# Patient Record
Sex: FEMALE | Race: BLACK OR AFRICAN AMERICAN | NOT HISPANIC OR LATINO
[De-identification: names, ages, dates, MRNs, and addresses within clinical notes are randomized per-mention and may not be internally consistent; named-entity substitution may affect disease eponyms.]

---

## 2020-07-08 PROBLEM — Z00.00 ENCOUNTER FOR PREVENTIVE HEALTH EXAMINATION: Status: ACTIVE | Noted: 2020-07-08

## 2020-07-09 ENCOUNTER — APPOINTMENT (OUTPATIENT)
Dept: RHEUMATOLOGY | Facility: CLINIC | Age: 50
End: 2020-07-09
Payer: MEDICAID

## 2020-07-09 ENCOUNTER — LABORATORY RESULT (OUTPATIENT)
Age: 50
End: 2020-07-09

## 2020-07-09 VITALS
SYSTOLIC BLOOD PRESSURE: 137 MMHG | HEIGHT: 63 IN | BODY MASS INDEX: 21.97 KG/M2 | DIASTOLIC BLOOD PRESSURE: 91 MMHG | HEART RATE: 70 BPM | WEIGHT: 124 LBS | TEMPERATURE: 97.6 F | OXYGEN SATURATION: 98 %

## 2020-07-09 PROCEDURE — 99204 OFFICE O/P NEW MOD 45 MIN: CPT

## 2020-07-16 LAB
ACE BLD-CCNC: 53 U/L
ALBUMIN SERPL ELPH-MCNC: 4.2 G/DL
ALP BLD-CCNC: 56 U/L
ALT SERPL-CCNC: 14 U/L
ANA PAT FLD IF-IMP: ABNORMAL
ANA SER IF-ACNC: ABNORMAL
ANION GAP SERPL CALC-SCNC: 14 MMOL/L
APPEARANCE: CLEAR
AST SERPL-CCNC: 19 U/L
B2 GLYCOPROT1 IGA SERPL IA-ACNC: 7.1 SAU
B2 GLYCOPROT1 IGG SER-ACNC: 5.3 SGU
B2 GLYCOPROT1 IGM SER-ACNC: <5 SMU
BACTERIA: NEGATIVE
BASOPHILS # BLD AUTO: 0.03 K/UL
BASOPHILS NFR BLD AUTO: 0.8 %
BILIRUB SERPL-MCNC: 0.7 MG/DL
BILIRUBIN URINE: NEGATIVE
BLOOD URINE: NEGATIVE
BUN SERPL-MCNC: 11 MG/DL
C3 SERPL-MCNC: 126 MG/DL
C4 SERPL-MCNC: 33 MG/DL
CALCIUM SERPL-MCNC: 9.5 MG/DL
CARDIOLIPIN AB SER IA-ACNC: NEGATIVE
CARDIOLIPIN IGM SER-MCNC: 11.2 GPL
CARDIOLIPIN IGM SER-MCNC: <5 MPL
CCP AB SER IA-ACNC: 32 UNITS
CHLORIDE SERPL-SCNC: 103 MMOL/L
CO2 SERPL-SCNC: 25 MMOL/L
COLOR: YELLOW
CREAT SERPL-MCNC: 0.56 MG/DL
CREAT SPEC-SCNC: 185 MG/DL
CREAT/PROT UR: 0.1 RATIO
CRP SERPL-MCNC: 0.18 MG/DL
DEPRECATED CARDIOLIPIN IGA SER: <5 APL
DSDNA AB SER-ACNC: 66 IU/ML
ENA RNP AB SER IA-ACNC: 0.3 AL
ENA SCL70 IGG SER IA-ACNC: <0.2 AL
ENA SM AB SER IA-ACNC: <0.2 AL
ENA SS-A AB SER IA-ACNC: <0.2 AL
ENA SS-B AB SER IA-ACNC: <0.2 AL
EOSINOPHIL # BLD AUTO: 0.07 K/UL
EOSINOPHIL NFR BLD AUTO: 1.9 %
ERYTHROCYTE [SEDIMENTATION RATE] IN BLOOD BY WESTERGREN METHOD: 61 MM/HR
G6PD SER-CCNC: 15.4 U/G HGB
GLUCOSE QUALITATIVE U: NEGATIVE
GLUCOSE SERPL-MCNC: 81 MG/DL
HBV CORE IGG+IGM SER QL: NONREACTIVE
HBV SURFACE AB SER QL: NONREACTIVE
HBV SURFACE AG SER QL: NONREACTIVE
HCT VFR BLD CALC: 50.6 %
HCV AB SER QL: NONREACTIVE
HCV S/CO RATIO: 0.19 S/CO
HGB BLD-MCNC: 15.6 G/DL
HYALINE CASTS: 4 /LPF
IMM GRANULOCYTES NFR BLD AUTO: 0 %
KETONES URINE: NEGATIVE
LEUKOCYTE ESTERASE URINE: NEGATIVE
LYMPHOCYTES # BLD AUTO: 1.08 K/UL
LYMPHOCYTES NFR BLD AUTO: 28.6 %
M TB IFN-G BLD-IMP: NEGATIVE
MAN DIFF?: NORMAL
MCHC RBC-ENTMCNC: 30 PG
MCHC RBC-ENTMCNC: 30.8 GM/DL
MCV RBC AUTO: 97.3 FL
MICROSCOPIC-UA: NORMAL
MONOCYTES # BLD AUTO: 0.32 K/UL
MONOCYTES NFR BLD AUTO: 8.5 %
NEUTROPHILS # BLD AUTO: 2.28 K/UL
NEUTROPHILS NFR BLD AUTO: 60.2 %
NITRITE URINE: NEGATIVE
PH URINE: 5.5
PLATELET # BLD AUTO: 196 K/UL
POTASSIUM SERPL-SCNC: 5 MMOL/L
PROT SERPL-MCNC: 8.2 G/DL
PROT UR-MCNC: 14 MG/DL
PROTEIN URINE: NORMAL
QUANTIFERON TB PLUS MITOGEN MINUS NIL: 7.32 IU/ML
QUANTIFERON TB PLUS NIL: 0.01 IU/ML
QUANTIFERON TB PLUS TB1 MINUS NIL: 0 IU/ML
QUANTIFERON TB PLUS TB2 MINUS NIL: 0 IU/ML
RBC # BLD: 5.2 M/UL
RBC # FLD: 14.9 %
RED BLOOD CELLS URINE: 6 /HPF
RF+CCP IGG SER-IMP: ABNORMAL
RHEUMATOID FACT SER QL: <10 IU/ML
SODIUM SERPL-SCNC: 142 MMOL/L
SPECIFIC GRAVITY URINE: 1.03
SQUAMOUS EPITHELIAL CELLS: 3 /HPF
THYROGLOB AB SERPL-ACNC: <20 IU/ML
THYROPEROXIDASE AB SERPL IA-ACNC: <10 IU/ML
TSH SERPL-ACNC: 2.87 UIU/ML
UROBILINOGEN URINE: NORMAL
WBC # FLD AUTO: 3.78 K/UL
WHITE BLOOD CELLS URINE: 3 /HPF

## 2020-07-16 NOTE — ASSESSMENT
[FreeTextEntry1] : 50F with benign cystic breast lesions with elevated RADHA 1:1280 but no features on SLE or other autoimmune disease. Unclear what the cystic breast lesions are - if pt has inflammatory/granulomatous mastitis it might explain the elevated RADHA.\par \par Plan:\par -obtain all pertinent autoimmune labs. \par -asked patient to obtain records of breast biopsy\par -follow up after above

## 2020-07-16 NOTE — CONSULT LETTER
[Dear  ___] : Dear  [unfilled], [Consult Letter:] : I had the pleasure of evaluating your patient, [unfilled]. [Please see my note below.] : Please see my note below. [Consult Closing:] : Thank you very much for allowing me to participate in the care of this patient.  If you have any questions, please do not hesitate to contact me. [Sincerely,] : Sincerely, [FreeTextEntry2] : Dr. Gladis Tapia\par [id number] [facility]\[place] [address] [FreeTextEntry3] : Dr. Isabel Mckeon\par

## 2020-07-16 NOTE — HISTORY OF PRESENT ILLNESS
[Anorexia] : no anorexia [Weight Loss] : no weight loss [Malaise] : no malaise [Fever] : no fever [Chills] : no chills [Fatigue] : no fatigue [Depression] : no depression [Malar Facial Rash] : no malar facial rash [Skin Lesions] : no lesions [Skin Nodules] : no skin nodules [Oral Ulcers] : no oral ulcers [Cough] : no cough [Dysphonia] : no dysphonia [Dysphagia] : no dysphagia [Shortness of Breath] : no shortness of breath [Chest Pain] : no chest pain [Arthralgias] : no arthralgias [Joint Swelling] : no joint swelling [Joint Warmth] : no joint warmth [Joint Deformity] : no joint deformity [Decreased ROM] : no decreased range of motion [Morning Stiffness] : no morning stiffness [Falls] : no falls [FreeTextEntry1] : no hair loss, photosensitivity or Raynauds

## 2020-07-16 NOTE — PHYSICAL EXAM
[General Appearance - Alert] : alert [General Appearance - In No Acute Distress] : in no acute distress [Sclera] : the sclera and conjunctiva were normal [Nasal Cavity] : the nasal mucosa and septum were normal [Neck Appearance] : the appearance of the neck was normal [Neck Cervical Mass (___cm)] : no neck mass was observed [Auscultation Breath Sounds / Voice Sounds] : lungs were clear to auscultation bilaterally [Heart Sounds] : normal S1 and S2 [Heart Sounds Gallop] : no gallops [Murmurs] : no murmurs [Cervical Lymph Nodes Enlarged Posterior Bilaterally] : posterior cervical [Cervical Lymph Nodes Enlarged Anterior Bilaterally] : anterior cervical [Supraclavicular Lymph Nodes Enlarged Bilaterally] : supraclavicular [No Spinal Tenderness] : no spinal tenderness [Musculoskeletal - Swelling] : no joint swelling seen [] : no rash [Sensation] : the sensory exam was normal to light touch and pinprick [Motor Exam] : the motor exam was normal [Oriented To Time, Place, And Person] : oriented to person, place, and time

## 2020-07-21 ENCOUNTER — APPOINTMENT (OUTPATIENT)
Dept: RHEUMATOLOGY | Facility: CLINIC | Age: 50
End: 2020-07-21
Payer: MEDICAID

## 2020-07-21 PROCEDURE — 99441: CPT

## 2020-07-22 ENCOUNTER — TRANSCRIPTION ENCOUNTER (OUTPATIENT)
Age: 50
End: 2020-07-22

## 2020-09-09 NOTE — PHYSICAL EXAM
[General Appearance - Alert] : alert [General Appearance - In No Acute Distress] : in no acute distress [Nasal Cavity] : the nasal mucosa and septum were normal [Neck Appearance] : the appearance of the neck was normal [Sclera] : the sclera and conjunctiva were normal [Neck Cervical Mass (___cm)] : no neck mass was observed [Auscultation Breath Sounds / Voice Sounds] : lungs were clear to auscultation bilaterally [Heart Sounds Gallop] : no gallops [Heart Sounds] : normal S1 and S2 [Murmurs] : no murmurs [Cervical Lymph Nodes Enlarged Posterior Bilaterally] : posterior cervical [Cervical Lymph Nodes Enlarged Anterior Bilaterally] : anterior cervical [Supraclavicular Lymph Nodes Enlarged Bilaterally] : supraclavicular [Musculoskeletal - Swelling] : no joint swelling seen [No Spinal Tenderness] : no spinal tenderness [Sensation] : the sensory exam was normal to light touch and pinprick [] : no rash [Oriented To Time, Place, And Person] : oriented to person, place, and time [Motor Exam] : the motor exam was normal

## 2020-09-10 ENCOUNTER — APPOINTMENT (OUTPATIENT)
Dept: RHEUMATOLOGY | Facility: CLINIC | Age: 50
End: 2020-09-10
Payer: MEDICAID

## 2020-09-10 ENCOUNTER — LABORATORY RESULT (OUTPATIENT)
Age: 50
End: 2020-09-10

## 2020-09-10 VITALS
WEIGHT: 125 LBS | BODY MASS INDEX: 22.15 KG/M2 | SYSTOLIC BLOOD PRESSURE: 162 MMHG | DIASTOLIC BLOOD PRESSURE: 84 MMHG | OXYGEN SATURATION: 98 % | HEART RATE: 62 BPM | HEIGHT: 63 IN | TEMPERATURE: 97.4 F

## 2020-09-10 DIAGNOSIS — R76.8 OTHER SPECIFIED ABNORMAL IMMUNOLOGICAL FINDINGS IN SERUM: ICD-10-CM

## 2020-09-10 PROCEDURE — 99213 OFFICE O/P EST LOW 20 MIN: CPT

## 2020-09-13 NOTE — ASSESSMENT
[FreeTextEntry1] : 50F with reactive lymphadenopathy that has resolved with elevated RADHA 1:1280, dsDNA 66, CCP+ RF neg but no other features of SLE or other autoimmune disease. \par \par Plan:\par -discussed SMILE study - HCQ vs placebo for positive RADHA. Pt is not interested in HCQ at the present time. \par -obtain US records from Dr Luciano\par -Would recommend to get: crithidia dsDNA, SPEP, UPEP, immunofixation s + u, repeat dsDNA\par -cardio referral for palpitations\par -BP high today - follow up with PCP for further management.\par -Pt does not have indication

## 2020-09-13 NOTE — HISTORY OF PRESENT ILLNESS
[FreeTextEntry1] : Follow up for positive RADHA.\par \par Interval history:\par patient is feeling well. No f,c,weight loss, hair loss, rash, joint pain, mucosal ulcers, SOB, CP, N/V, diarrhea.\par Labs from 7/9/2020 reviewed with patient: RADHA 1:1280 dsDNA 66 C3 C4 normal ESR 61 CRP 0.18 UA bland RF neg CCP 32\par 1/2020 Right axillary LN 3cm, core biopsy consistent with reactive LN.\par Patient had repeat US on 7/16/2020. Does not have records.\par \par PHI:\par 50F with PMH of KIMBERLY coming for positive RADHA 1:1280 checked on 5/21/2020 as part of physical by PCP Dr. Iyer. Patient has been in her regular state of health. Had COVID 2 months ago (symptoms of nausea, fevers, loss of taste and smell, no respiratory symptoms), COVID IgG positive. Sometimes gets shin splints after extended walking. \par Pt had mammogram showing cystic lesions 12/2019, had biopsy, does not know results, told it was benign (said it was inflammatory mastitis??), following up next week. Colonoscopy age 49 normal. PAP smear UTD\par 1/14/2020 right axillary LN core biopsy - 3cm, pathology consistent with reactive LN. \par \par PMH: fibroids; + palpitations at rest on propranolol\par Obstetric hx: no children, no miscarriages\par  works in [employer]\par FH: lupus cousin

## 2020-09-27 LAB
ALBUMIN MFR SERPL ELPH: 52.4 %
ALBUMIN SERPL-MCNC: 4.2 G/DL
ALBUMIN/GLOB SERPL: 1.1 RATIO
ALBUPE: 27.9 %
ALPHA1 GLOB MFR SERPL ELPH: 2.8 %
ALPHA1 GLOB SERPL ELPH-MCNC: 0.2 G/DL
ALPHA1UPE: 25.5 %
ALPHA2 GLOB MFR SERPL ELPH: 7.4 %
ALPHA2 GLOB SERPL ELPH-MCNC: 0.6 G/DL
ALPHA2UPE: 23.3 %
ANA PAT FLD IF-IMP: ABNORMAL
ANA SER IF-ACNC: ABNORMAL
APPEARANCE: CLEAR
B-GLOBULIN MFR SERPL ELPH: 11 %
B-GLOBULIN SERPL ELPH-MCNC: 0.9 G/DL
BACTERIA: NEGATIVE
BETAUPE: 12 %
BILIRUBIN URINE: NEGATIVE
BLOOD URINE: NEGATIVE
C3 SERPL-MCNC: 88 MG/DL
C4 SERPL-MCNC: 20 MG/DL
CCP AB SER IA-ACNC: 22 UNITS
COLOR: NORMAL
CREAT 24H UR-MCNC: NORMAL G/24 H
CREAT SPEC-SCNC: 112 MG/DL
CREAT/PROT UR: 0.1 RATIO
CREATININE UR (MAYO): 109 MG/DL
CRP SERPL-MCNC: <0.1 MG/DL
DEPRECATED KAPPA LC FREE/LAMBDA SER: 1.56 RATIO
DSDNA AB SER-ACNC: 31 IU/ML
ERYTHROCYTE [SEDIMENTATION RATE] IN BLOOD BY WESTERGREN METHOD: 43 MM/HR
GAMMA GLOB FLD ELPH-MCNC: 2.1 G/DL
GAMMA GLOB MFR SERPL ELPH: 26.4 %
GAMMAUPE: 11.3 %
GLUCOSE QUALITATIVE U: NEGATIVE
HYALINE CASTS: 0 /LPF
IGA 24H UR QL IFE: NORMAL
IGA SER QL IEP: 490 MG/DL
IGG SER QL IEP: 1884 MG/DL
IGM SER QL IEP: 27 MG/DL
INTERPRETATION SERPL IEP-IMP: NORMAL
KAPPA LC 24H UR QL: NORMAL
KAPPA LC CSF-MCNC: 1.33 MG/DL
KAPPA LC SERPL-MCNC: 2.07 MG/DL
KETONES URINE: NEGATIVE
LEUKOCYTE ESTERASE URINE: NEGATIVE
M PROTEIN SPEC IFE-MCNC: NORMAL
MICROSCOPIC-UA: NORMAL
NITRITE URINE: NEGATIVE
PH URINE: 6.5
PROT PATTERN 24H UR ELPH-IMP: NORMAL
PROT SERPL-MCNC: 8 G/DL
PROT SERPL-MCNC: 8 G/DL
PROT UR-MCNC: 11 MG/DL
PROT UR-MCNC: 18 MG/DL
PROT UR-MCNC: 18 MG/DL
PROTEIN URINE: NEGATIVE
RED BLOOD CELLS URINE: 2 /HPF
RF+CCP IGG SER-IMP: ABNORMAL
RHEUMATOID FACT SER QL: <10 IU/ML
SPECIFIC GRAVITY URINE: 1.02
SPECIMEN VOL 24H UR: NORMAL ML
SQUAMOUS EPITHELIAL CELLS: 2 /HPF
UROBILINOGEN URINE: NORMAL
WHITE BLOOD CELLS URINE: 1 /HPF

## 2021-02-10 ENCOUNTER — RX RENEWAL (OUTPATIENT)
Age: 51
End: 2021-02-10

## 2021-04-28 ENCOUNTER — APPOINTMENT (OUTPATIENT)
Dept: CARDIOLOGY | Facility: CLINIC | Age: 51
End: 2021-04-28
Payer: MEDICAID

## 2021-04-28 ENCOUNTER — NON-APPOINTMENT (OUTPATIENT)
Age: 51
End: 2021-04-28

## 2021-04-28 VITALS
WEIGHT: 125 LBS | HEART RATE: 64 BPM | SYSTOLIC BLOOD PRESSURE: 158 MMHG | OXYGEN SATURATION: 99 % | DIASTOLIC BLOOD PRESSURE: 94 MMHG | HEIGHT: 63 IN | BODY MASS INDEX: 22.15 KG/M2 | TEMPERATURE: 98 F

## 2021-04-28 VITALS — SYSTOLIC BLOOD PRESSURE: 148 MMHG | DIASTOLIC BLOOD PRESSURE: 88 MMHG

## 2021-04-28 DIAGNOSIS — Z87.898 PERSONAL HISTORY OF OTHER SPECIFIED CONDITIONS: ICD-10-CM

## 2021-04-28 DIAGNOSIS — Z82.49 FAMILY HISTORY OF ISCHEMIC HEART DISEASE AND OTHER DISEASES OF THE CIRCULATORY SYSTEM: ICD-10-CM

## 2021-04-28 DIAGNOSIS — Z78.9 OTHER SPECIFIED HEALTH STATUS: ICD-10-CM

## 2021-04-28 DIAGNOSIS — U07.1 COVID-19: ICD-10-CM

## 2021-04-28 PROCEDURE — 99072 ADDL SUPL MATRL&STAF TM PHE: CPT

## 2021-04-28 PROCEDURE — 93225 XTRNL ECG REC<48 HRS REC: CPT

## 2021-04-28 PROCEDURE — 93306 TTE W/DOPPLER COMPLETE: CPT

## 2021-04-28 PROCEDURE — 99204 OFFICE O/P NEW MOD 45 MIN: CPT

## 2021-04-28 PROCEDURE — 93000 ELECTROCARDIOGRAM COMPLETE: CPT | Mod: 59

## 2021-04-28 RX ORDER — ATORVASTATIN CALCIUM 20 MG/1
20 TABLET, FILM COATED ORAL
Refills: 0 | Status: ACTIVE | COMMUNITY

## 2021-04-28 NOTE — HISTORY OF PRESENT ILLNESS
[FreeTextEntry1] : Dimple Branch is a 50 year old female with history of hypertension and hyperlipidemia comes for cardiac evaluation. Denies any chest pain or shortness of breath on exertion. Complaining of 1 year history of random onset of palpitations which lasts for few minutes, feels weak  and relieved by itself. Started having palpitations since 1 year after recovered from COVID. Was started on Propranolol by Dr. Tapia with some improvement. 3 weeks ago had another episode of palpitations. Not taking Amlodipine for hypertension. Drinks coffee, hot chocolate and lots of Tea. Physically active.

## 2021-04-28 NOTE — DISCUSSION/SUMMARY
[FreeTextEntry1] : In a summary reynaldo Branch is a middle aged female with palpitations, continue Propranolol. Stop all caffeinated drinks. 24 hour Holter monitor to rule out arrhythmias. Echo done showed mildly decreased LV systolic function. Results explained and will get Cardiac CT angiogram to rule out coronary artery disease. Explained MS. Soares in detail and understood. hypertension, uncontrolled. Told her to restart Amlodipine and 2 gm sodium diet. Hypercholesterolemia, on statins and low cholesterol diet. LDL goal less than 130 g/dL. Follow up in 6 weeks.

## 2021-04-28 NOTE — CARDIOLOGY SUMMARY
[de-identified] : 4/28/2021: Normal sinus rhythm.  [de-identified] : 4/28/2021: Mildly decreased LV systolic function. LVEF 40- 45%.

## 2021-04-28 NOTE — REVIEW OF SYSTEMS
[Palpitations] : palpitations [Negative] : Psychiatric [SOB] : no shortness of breath [Chest Discomfort] : no chest discomfort [Lower Ext Edema] : no extremity edema [Orthopnea] : no orthopnea [PND] : no PND [Syncope] : no syncope [Easy Bleeding] : no tendency for easy bleeding [Easy Bruising] : no tendency for easy bruising

## 2021-05-04 ENCOUNTER — NON-APPOINTMENT (OUTPATIENT)
Age: 51
End: 2021-05-04

## 2021-05-04 ENCOUNTER — APPOINTMENT (OUTPATIENT)
Dept: CARDIOLOGY | Facility: CLINIC | Age: 51
End: 2021-05-04
Payer: MEDICAID

## 2021-05-04 PROCEDURE — 99072 ADDL SUPL MATRL&STAF TM PHE: CPT

## 2021-05-04 PROCEDURE — 93227 XTRNL ECG REC<48 HR R&I: CPT

## 2021-05-11 ENCOUNTER — OUTPATIENT (OUTPATIENT)
Dept: OUTPATIENT SERVICES | Facility: HOSPITAL | Age: 51
LOS: 1 days | End: 2021-05-11
Payer: MEDICAID

## 2021-05-11 ENCOUNTER — APPOINTMENT (OUTPATIENT)
Dept: CT IMAGING | Facility: CLINIC | Age: 51
End: 2021-05-11
Payer: MEDICAID

## 2021-05-11 DIAGNOSIS — R00.2 PALPITATIONS: ICD-10-CM

## 2021-05-11 DIAGNOSIS — I42.9 CARDIOMYOPATHY, UNSPECIFIED: ICD-10-CM

## 2021-05-11 PROCEDURE — 82565 ASSAY OF CREATININE: CPT

## 2021-05-11 PROCEDURE — 75574 CT ANGIO HRT W/3D IMAGE: CPT

## 2021-05-11 PROCEDURE — 75574 CT ANGIO HRT W/3D IMAGE: CPT | Mod: 26

## 2021-06-09 ENCOUNTER — APPOINTMENT (OUTPATIENT)
Dept: CARDIOLOGY | Facility: CLINIC | Age: 51
End: 2021-06-09
Payer: MEDICAID

## 2021-06-09 VITALS
BODY MASS INDEX: 22.15 KG/M2 | SYSTOLIC BLOOD PRESSURE: 131 MMHG | DIASTOLIC BLOOD PRESSURE: 76 MMHG | TEMPERATURE: 97.4 F | HEART RATE: 75 BPM | WEIGHT: 125 LBS | OXYGEN SATURATION: 98 % | HEIGHT: 63 IN

## 2021-06-09 PROCEDURE — 99214 OFFICE O/P EST MOD 30 MIN: CPT

## 2021-06-09 NOTE — REVIEW OF SYSTEMS
[Negative] : Psychiatric [SOB] : no shortness of breath [Chest Discomfort] : no chest discomfort [Lower Ext Edema] : no extremity edema [Palpitations] : no palpitations [Orthopnea] : no orthopnea [PND] : no PND [Syncope] : no syncope [Easy Bleeding] : no tendency for easy bleeding [Easy Bruising] : no tendency for easy bruising

## 2021-06-09 NOTE — HISTORY OF PRESENT ILLNESS
[FreeTextEntry1] : Dimple Branch is a 51 year old female with hypertension and mild cardiomyopathy comes for follow up visit. Denies any chest pain . Palpitations improved significantly. No shortness of breath on exertion. Compliant to medications and diet. Physically active.

## 2021-06-09 NOTE — DISCUSSION/SUMMARY
[FreeTextEntry1] : In a summary Dimple Branch is a middle aged female with Hypertension, controlled. Mild cardiomyopathy, Coronary CT angiogram showed no significant coronary blockages. Tight control of Blood pressure. Recommend to change Propranolol to Metoprolol ER. She has follow up appointment with Dr. Tapia next week. Continue healthy lifestyle. Follow up in 4 months.

## 2021-08-20 ENCOUNTER — RX RENEWAL (OUTPATIENT)
Age: 51
End: 2021-08-20

## 2021-10-06 ENCOUNTER — APPOINTMENT (OUTPATIENT)
Dept: CARDIOLOGY | Facility: CLINIC | Age: 51
End: 2021-10-06
Payer: MEDICAID

## 2021-10-06 ENCOUNTER — NON-APPOINTMENT (OUTPATIENT)
Age: 51
End: 2021-10-06

## 2021-10-06 VITALS
DIASTOLIC BLOOD PRESSURE: 80 MMHG | OXYGEN SATURATION: 100 % | WEIGHT: 128 LBS | SYSTOLIC BLOOD PRESSURE: 128 MMHG | BODY MASS INDEX: 22.68 KG/M2 | HEIGHT: 63 IN | RESPIRATION RATE: 12 BRPM | TEMPERATURE: 97.4 F | HEART RATE: 72 BPM

## 2021-10-06 VITALS
TEMPERATURE: 97.4 F | OXYGEN SATURATION: 100 % | DIASTOLIC BLOOD PRESSURE: 80 MMHG | HEART RATE: 72 BPM | BODY MASS INDEX: 22.68 KG/M2 | HEIGHT: 63 IN | WEIGHT: 128 LBS | SYSTOLIC BLOOD PRESSURE: 128 MMHG

## 2021-10-06 PROCEDURE — 93000 ELECTROCARDIOGRAM COMPLETE: CPT

## 2021-10-06 PROCEDURE — 99214 OFFICE O/P EST MOD 30 MIN: CPT

## 2021-10-06 RX ORDER — PROPRANOLOL HYDROCHLORIDE 40 MG/1
40 TABLET ORAL DAILY
Refills: 0 | Status: DISCONTINUED | COMMUNITY
End: 2021-10-06

## 2021-10-06 RX ORDER — METOPROLOL SUCCINATE 25 MG/1
25 TABLET, EXTENDED RELEASE ORAL DAILY
Refills: 0 | Status: ACTIVE | COMMUNITY

## 2021-10-06 NOTE — DISCUSSION/SUMMARY
[FreeTextEntry1] : In a summary Dimple Branch is a middle aged female with Hypertension, controlled. Continue current medications and 2 gm sodium diet. Mild nonischemic cardiomyopathy, euvolemic. Continue healthy lifestyle. Follow up in 6 months, will reassess LV systolic function.

## 2021-10-06 NOTE — HISTORY OF PRESENT ILLNESS
[FreeTextEntry1] : Dimple Branch is a 51 year old female with hypertension and mild nonischemic cardiomyopathy comes for follow up visit. Denies any chest pain or shortness of breath on exertion. Palpitations improved significantly after changing Propranolol to Metoprolol. Taking Amlodipine 2.5 mg once daily. Medications reviewed and updated. Compliant to medications and diet.

## 2021-10-06 NOTE — REVIEW OF SYSTEMS
[Negative] : Psychiatric [SOB] : no shortness of breath [Dyspnea on exertion] : not dyspnea during exertion [Chest Discomfort] : no chest discomfort [Lower Ext Edema] : no extremity edema [Palpitations] : no palpitations [Orthopnea] : no orthopnea [PND] : no PND [Syncope] : no syncope [Easy Bleeding] : no tendency for easy bleeding [Easy Bruising] : no tendency for easy bruising

## 2022-02-14 ENCOUNTER — RX RENEWAL (OUTPATIENT)
Age: 52
End: 2022-02-14

## 2022-04-06 ENCOUNTER — NON-APPOINTMENT (OUTPATIENT)
Age: 52
End: 2022-04-06

## 2022-04-06 ENCOUNTER — APPOINTMENT (OUTPATIENT)
Dept: CARDIOLOGY | Facility: CLINIC | Age: 52
End: 2022-04-06
Payer: MEDICAID

## 2022-04-06 VITALS
HEIGHT: 63 IN | SYSTOLIC BLOOD PRESSURE: 118 MMHG | WEIGHT: 124 LBS | BODY MASS INDEX: 21.97 KG/M2 | HEART RATE: 70 BPM | OXYGEN SATURATION: 100 % | DIASTOLIC BLOOD PRESSURE: 76 MMHG

## 2022-04-06 PROCEDURE — 93000 ELECTROCARDIOGRAM COMPLETE: CPT

## 2022-04-06 PROCEDURE — 99214 OFFICE O/P EST MOD 30 MIN: CPT

## 2022-04-06 NOTE — REVIEW OF SYSTEMS
[Palpitations] : palpitations [Negative] : Psychiatric [SOB] : no shortness of breath [Dyspnea on exertion] : not dyspnea during exertion [Chest Discomfort] : no chest discomfort [Lower Ext Edema] : no extremity edema [Orthopnea] : no orthopnea [PND] : no PND [Syncope] : no syncope [Easy Bleeding] : no tendency for easy bleeding [Easy Bruising] : no tendency for easy bruising [FreeTextEntry5] : occasional palpitations.

## 2022-04-06 NOTE — HISTORY OF PRESENT ILLNESS
[FreeTextEntry1] : Dimple Branch is a 51 year old female with hypertension, hypercholesterolemia and mild nonischemic cardiomyopathy comes for follow up visit. Denies any chest pain or shortness of breath on exertion. Palpitations improved significantly after changing Propranolol to Metoprolol. Still having occasional palpitations. Stopped drinking coffee. Drinks one cup of Tea. Compliant to medications and diet.

## 2022-04-06 NOTE — DISCUSSION/SUMMARY
[FreeTextEntry1] : In a summary Dimple Branch is a middle aged female with Hypertension, controlled. Continue Amlodipine  and 2 gm sodium diet. Mild nonischemic cardiomyopathy, euvolemic. Palpitations, continue Metoprolol. Hypercholesterolemia, continue Atorvastatin and low cholesterol diet. LDL goal less than 130 g/dL. Continue healthy lifestyle. Follow up in 4 months. Cardiomyopathy and palpitations, will get Echo to reassess LV systolic function. If LV systolic function is still low will change Amlodipine to Lisinopril. Explained Ms. Soares in detail.

## 2022-06-09 ENCOUNTER — RX RENEWAL (OUTPATIENT)
Age: 52
End: 2022-06-09

## 2022-06-29 ENCOUNTER — APPOINTMENT (OUTPATIENT)
Dept: CARDIOLOGY | Facility: CLINIC | Age: 52
End: 2022-06-29
Payer: MEDICAID

## 2022-06-29 DIAGNOSIS — R00.2 PALPITATIONS: ICD-10-CM

## 2022-06-29 PROCEDURE — 93306 TTE W/DOPPLER COMPLETE: CPT

## 2022-09-29 ENCOUNTER — APPOINTMENT (OUTPATIENT)
Dept: CARDIOLOGY | Facility: CLINIC | Age: 52
End: 2022-09-29

## 2022-09-29 ENCOUNTER — NON-APPOINTMENT (OUTPATIENT)
Age: 52
End: 2022-09-29

## 2022-09-29 VITALS
DIASTOLIC BLOOD PRESSURE: 76 MMHG | HEART RATE: 69 BPM | BODY MASS INDEX: 22.15 KG/M2 | TEMPERATURE: 97.2 F | SYSTOLIC BLOOD PRESSURE: 124 MMHG | WEIGHT: 125 LBS | HEIGHT: 63 IN | OXYGEN SATURATION: 99 %

## 2022-09-29 PROCEDURE — 93000 ELECTROCARDIOGRAM COMPLETE: CPT

## 2022-09-29 PROCEDURE — 99214 OFFICE O/P EST MOD 30 MIN: CPT | Mod: 25

## 2022-09-29 RX ORDER — AMLODIPINE BESYLATE 5 MG/1
5 TABLET ORAL
Qty: 30 | Refills: 3 | Status: DISCONTINUED | COMMUNITY
Start: 2020-09-10 | End: 2022-09-29

## 2022-09-29 NOTE — DISCUSSION/SUMMARY
[FreeTextEntry1] : In a summary Dimple Branch is a middle aged female with Hypertension, controlled.Recommend to change Amlodipine to low dose ACEI if BMP normal in view of mild cardiomyopathy.  Mild nonischemic cardiomyopathy, euvolemic. Palpitations, continue Metoprolol. Hypercholesterolemia, continue Atorvastatin and low cholesterol diet. LDL goal less than 130 g/dL. Continue healthy lifestyle. Follow up in 4 months.

## 2022-09-29 NOTE — REVIEW OF SYSTEMS
[Negative] : Genitourinary [Blurry Vision] : no blurred vision [SOB] : no shortness of breath [Dyspnea on exertion] : not dyspnea during exertion [Chest Discomfort] : no chest discomfort [Lower Ext Edema] : no extremity edema [Palpitations] : no palpitations [Orthopnea] : no orthopnea [PND] : no PND [Syncope] : no syncope [Abdominal Pain] : no abdominal pain [Nausea] : no nausea [Vomiting] : no vomiting [Heartburn] : no heartburn [Joint Pain] : no joint pain [Rash] : no rash [Itching] : no itching [Dizziness] : no dizziness [Tremor] : no tremor was seen [Numbness (Hypoesthesia)] : no numbness [Tingling (Paresthesia)] : no tingling [Confusion] : no confusion was observed [Anxiety] : no anxiety [Under Stress] : not under stress [Easy Bleeding] : no tendency for easy bleeding [Easy Bruising] : no tendency for easy bruising

## 2022-09-29 NOTE — HISTORY OF PRESENT ILLNESS
[FreeTextEntry1] : Dimple Branch is a 52 year old female with hypertension, hypercholesterolemia and mild nonischemic cardiomyopathy comes for follow up visit. Denies any chest pain or shortness of breath on exertion. Palpitations improved significantly after started taking  Metoprolol. Taking Amlodipine 2.5 mg as she is having dizzy spells with 5 mg.  Compliant to medications and diet.

## 2023-01-26 ENCOUNTER — APPOINTMENT (OUTPATIENT)
Dept: CARDIOLOGY | Facility: CLINIC | Age: 53
End: 2023-01-26
Payer: MEDICAID

## 2023-01-26 VITALS
DIASTOLIC BLOOD PRESSURE: 88 MMHG | WEIGHT: 130 LBS | BODY MASS INDEX: 23.04 KG/M2 | SYSTOLIC BLOOD PRESSURE: 156 MMHG | OXYGEN SATURATION: 99 % | HEIGHT: 63 IN | HEART RATE: 67 BPM

## 2023-01-26 VITALS — DIASTOLIC BLOOD PRESSURE: 80 MMHG | SYSTOLIC BLOOD PRESSURE: 130 MMHG

## 2023-01-26 PROCEDURE — 99214 OFFICE O/P EST MOD 30 MIN: CPT

## 2023-01-26 NOTE — DISCUSSION/SUMMARY
[FreeTextEntry1] : In a summary Dimple Branch is a middle aged female with Hypertension, controlled.Recommend  low dose ACEI  in view of mild cardiomyopathy after checking Creatinine and K levels. Side effects of Lisinopril explained including Angioedema. Does not want to change her medications after knowing benefits of Lisinopril. Continue Amlodipine and Metoprolol.   Mild nonischemic cardiomyopathy, euvolemic.  Hypercholesterolemia, continue Atorvastatin and low cholesterol diet. LDL goal less than 130 g/dL. Continue healthy lifestyle. Follow up in 6 months.

## 2023-01-26 NOTE — HISTORY OF PRESENT ILLNESS
[FreeTextEntry1] : Dimple Branch is a 52 year old female with hypertension, hypercholesterolemia and mild nonischemic cardiomyopathy comes for follow up visit. Denies any chest pain or shortness of breath on exertion. Palpitations improved significantly after started taking  Metoprolol.   Compliant to medications and diet.

## 2023-01-26 NOTE — REASON FOR VISIT
[Hyperlipidemia] : hyperlipidemia [Hypertension] : hypertension [Other: ____] : [unfilled] [FreeTextEntry3] : Dr. Tapia

## 2023-06-02 ENCOUNTER — TRANSCRIPTION ENCOUNTER (OUTPATIENT)
Age: 53
End: 2023-06-02

## 2023-06-02 RX ORDER — ASPIRIN ENTERIC COATED TABLETS 81 MG 81 MG/1
81 TABLET, DELAYED RELEASE ORAL DAILY
Qty: 30 | Refills: 6 | Status: ACTIVE | COMMUNITY
Start: 1900-01-01 | End: 1900-01-01

## 2023-07-27 ENCOUNTER — APPOINTMENT (OUTPATIENT)
Dept: CARDIOLOGY | Facility: CLINIC | Age: 53
End: 2023-07-27
Payer: MEDICAID

## 2023-07-27 ENCOUNTER — NON-APPOINTMENT (OUTPATIENT)
Age: 53
End: 2023-07-27

## 2023-07-27 VITALS
RESPIRATION RATE: 15 BRPM | WEIGHT: 125 LBS | DIASTOLIC BLOOD PRESSURE: 80 MMHG | TEMPERATURE: 98 F | HEART RATE: 65 BPM | BODY MASS INDEX: 22.15 KG/M2 | SYSTOLIC BLOOD PRESSURE: 132 MMHG | HEIGHT: 63 IN | OXYGEN SATURATION: 98 %

## 2023-07-27 PROCEDURE — 93000 ELECTROCARDIOGRAM COMPLETE: CPT

## 2023-07-27 PROCEDURE — 93306 TTE W/DOPPLER COMPLETE: CPT

## 2023-07-27 PROCEDURE — 99214 OFFICE O/P EST MOD 30 MIN: CPT | Mod: 25

## 2023-07-27 NOTE — DISCUSSION/SUMMARY
[FreeTextEntry1] : In a summary Dimple Branch is a middle aged female with Hypertension, controlled.Recommend  low dose ACEI  . Side effects of Lisinopril explained including Angioedema. Does not want to change her medications after knowing benefits of Lisinopril. Continue Amlodipine and Metoprolol. Mild nonischemic cardiomyopathy, euvolemic.  Echo done showed mildly decreased LV systolic function which is unchanged from previous Echo. Results explained. Hypercholesterolemia, continue Atorvastatin and low cholesterol diet. LDL goal less than 130 g/dL. Continue healthy lifestyle. Follow up in 6 months.

## 2023-07-27 NOTE — HISTORY OF PRESENT ILLNESS
[FreeTextEntry1] : Dimple Branch is a 53 year old female with hypertension, hypercholesterolemia and mild nonischemic cardiomyopathy comes for follow up visit. Denies any chest pain or shortness of breath on exertion. Palpitations improved significantly after started taking  Metoprolol.   Compliant to medications and diet.

## 2024-01-25 ENCOUNTER — APPOINTMENT (OUTPATIENT)
Dept: CARDIOLOGY | Facility: CLINIC | Age: 54
End: 2024-01-25
Payer: MEDICAID

## 2024-01-25 ENCOUNTER — NON-APPOINTMENT (OUTPATIENT)
Age: 54
End: 2024-01-25

## 2024-01-25 VITALS
WEIGHT: 127 LBS | SYSTOLIC BLOOD PRESSURE: 136 MMHG | RESPIRATION RATE: 15 BRPM | HEART RATE: 69 BPM | DIASTOLIC BLOOD PRESSURE: 76 MMHG | OXYGEN SATURATION: 99 % | HEIGHT: 63 IN | TEMPERATURE: 97.8 F | BODY MASS INDEX: 22.5 KG/M2

## 2024-01-25 DIAGNOSIS — I42.9 CARDIOMYOPATHY, UNSPECIFIED: ICD-10-CM

## 2024-01-25 DIAGNOSIS — E78.00 PURE HYPERCHOLESTEROLEMIA, UNSPECIFIED: ICD-10-CM

## 2024-01-25 DIAGNOSIS — I10 ESSENTIAL (PRIMARY) HYPERTENSION: ICD-10-CM

## 2024-01-25 PROCEDURE — 99214 OFFICE O/P EST MOD 30 MIN: CPT | Mod: 25

## 2024-01-25 PROCEDURE — 93000 ELECTROCARDIOGRAM COMPLETE: CPT

## 2024-01-25 RX ORDER — AMLODIPINE BESYLATE 2.5 MG/1
2.5 TABLET ORAL
Qty: 90 | Refills: 1 | Status: ACTIVE | COMMUNITY
Start: 1900-01-01 | End: 1900-01-01

## 2024-07-19 ENCOUNTER — RX RENEWAL (OUTPATIENT)
Age: 54
End: 2024-07-19

## 2024-07-24 ENCOUNTER — APPOINTMENT (OUTPATIENT)
Dept: CARDIOLOGY | Facility: CLINIC | Age: 54
End: 2024-07-24
Payer: MEDICAID

## 2024-07-24 ENCOUNTER — RESULT REVIEW (OUTPATIENT)
Age: 54
End: 2024-07-24

## 2024-07-24 ENCOUNTER — NON-APPOINTMENT (OUTPATIENT)
Age: 54
End: 2024-07-24

## 2024-07-24 VITALS
TEMPERATURE: 98.3 F | WEIGHT: 121 LBS | OXYGEN SATURATION: 99 % | SYSTOLIC BLOOD PRESSURE: 129 MMHG | RESPIRATION RATE: 16 BRPM | HEART RATE: 64 BPM | BODY MASS INDEX: 21.44 KG/M2 | DIASTOLIC BLOOD PRESSURE: 78 MMHG | HEIGHT: 63 IN

## 2024-07-24 DIAGNOSIS — I10 ESSENTIAL (PRIMARY) HYPERTENSION: ICD-10-CM

## 2024-07-24 DIAGNOSIS — E78.00 PURE HYPERCHOLESTEROLEMIA, UNSPECIFIED: ICD-10-CM

## 2024-07-24 PROCEDURE — 93306 TTE W/DOPPLER COMPLETE: CPT

## 2024-07-24 PROCEDURE — 99214 OFFICE O/P EST MOD 30 MIN: CPT | Mod: 25

## 2024-07-24 PROCEDURE — 93000 ELECTROCARDIOGRAM COMPLETE: CPT

## 2024-07-24 NOTE — HISTORY OF PRESENT ILLNESS
[FreeTextEntry1] : Dimple Branch is a 54- year- old female with hypertension, hypercholesterolemia and mild nonischemic cardiomyopathy comes for follow up visit. Denies any chest pain or shortness of breath on exertion. She had stressful situation at home few weeks ago. At that time had episodes of palpitations and BP was high. No recurrent episodes.  Compliant to medications and diet.

## 2024-07-24 NOTE — DISCUSSION/SUMMARY
[FreeTextEntry1] : In a summary Dimple Branch is a middle-aged female with Hypertension, controlled. Recommend low dose ACEI. Side effects of Lisinopril explained including Angioedema. Does not want to change her medications after knowing benefits of Lisinopril. Continue Amlodipine and Metoprolol. Mild nonischemic cardiomyopathy, euvolemic. Echo done showed LVEF of 40 45% which is unchanged from previous Echo. Hypercholesterolemia, continue Atorvastatin and low cholesterol diet. LDL goal less than 130 g/dL. Continue healthy lifestyle. Follow up in 6 months.

## 2024-07-25 ENCOUNTER — APPOINTMENT (OUTPATIENT)
Dept: CARDIOLOGY | Facility: CLINIC | Age: 54
End: 2024-07-25

## 2025-01-15 ENCOUNTER — NON-APPOINTMENT (OUTPATIENT)
Age: 55
End: 2025-01-15

## 2025-01-15 ENCOUNTER — APPOINTMENT (OUTPATIENT)
Dept: CARDIOLOGY | Facility: CLINIC | Age: 55
End: 2025-01-15
Payer: MEDICAID

## 2025-01-15 VITALS
WEIGHT: 123 LBS | HEART RATE: 65 BPM | RESPIRATION RATE: 16 BRPM | DIASTOLIC BLOOD PRESSURE: 72 MMHG | SYSTOLIC BLOOD PRESSURE: 122 MMHG | HEIGHT: 63 IN | TEMPERATURE: 98.3 F | OXYGEN SATURATION: 100 % | BODY MASS INDEX: 21.79 KG/M2

## 2025-01-15 DIAGNOSIS — E78.00 PURE HYPERCHOLESTEROLEMIA, UNSPECIFIED: ICD-10-CM

## 2025-01-15 DIAGNOSIS — I10 ESSENTIAL (PRIMARY) HYPERTENSION: ICD-10-CM

## 2025-01-15 PROCEDURE — 99214 OFFICE O/P EST MOD 30 MIN: CPT | Mod: 25

## 2025-01-15 PROCEDURE — 93000 ELECTROCARDIOGRAM COMPLETE: CPT

## 2025-02-06 ENCOUNTER — RX RENEWAL (OUTPATIENT)
Age: 55
End: 2025-02-06

## 2025-07-14 ENCOUNTER — NON-APPOINTMENT (OUTPATIENT)
Age: 55
End: 2025-07-14

## 2025-07-16 ENCOUNTER — RESULT REVIEW (OUTPATIENT)
Age: 55
End: 2025-07-16

## 2025-07-16 ENCOUNTER — APPOINTMENT (OUTPATIENT)
Dept: CARDIOLOGY | Facility: CLINIC | Age: 55
End: 2025-07-16
Payer: MEDICAID

## 2025-07-16 VITALS
OXYGEN SATURATION: 99 % | SYSTOLIC BLOOD PRESSURE: 139 MMHG | HEIGHT: 63 IN | BODY MASS INDEX: 21.44 KG/M2 | TEMPERATURE: 97.8 F | HEART RATE: 71 BPM | RESPIRATION RATE: 16 BRPM | DIASTOLIC BLOOD PRESSURE: 79 MMHG | WEIGHT: 121 LBS

## 2025-07-16 PROCEDURE — 99214 OFFICE O/P EST MOD 30 MIN: CPT | Mod: 25

## 2025-07-16 PROCEDURE — 93306 TTE W/DOPPLER COMPLETE: CPT

## 2025-07-16 PROCEDURE — 93000 ELECTROCARDIOGRAM COMPLETE: CPT
